# Patient Record
Sex: FEMALE | Race: WHITE | NOT HISPANIC OR LATINO | Employment: FULL TIME | URBAN - METROPOLITAN AREA
[De-identification: names, ages, dates, MRNs, and addresses within clinical notes are randomized per-mention and may not be internally consistent; named-entity substitution may affect disease eponyms.]

---

## 2017-08-01 ENCOUNTER — TELEPHONE (OUTPATIENT)
Dept: OBSTETRICS AND GYNECOLOGY | Facility: CLINIC | Age: 52
End: 2017-08-01

## 2021-12-02 ENCOUNTER — OFFICE VISIT (OUTPATIENT)
Dept: OBSTETRICS AND GYNECOLOGY | Age: 56
End: 2021-12-02

## 2021-12-02 VITALS
SYSTOLIC BLOOD PRESSURE: 124 MMHG | DIASTOLIC BLOOD PRESSURE: 72 MMHG | BODY MASS INDEX: 26.54 KG/M2 | WEIGHT: 185.4 LBS | HEIGHT: 70 IN

## 2021-12-02 DIAGNOSIS — Z80.41 FAMILY HISTORY OF OVARIAN CANCER: Primary | ICD-10-CM

## 2021-12-02 DIAGNOSIS — R87.610 ATYPICAL SQUAMOUS CELL CHANGES OF UNDETERMINED SIGNIFICANCE (ASCUS) ON CERVICAL CYTOLOGY WITH NEGATIVE HIGH RISK HUMAN PAPILLOMA VIRUS (HPV) TEST RESULT: ICD-10-CM

## 2021-12-02 DIAGNOSIS — Z78.0 MENOPAUSE: ICD-10-CM

## 2021-12-02 PROCEDURE — 99202 OFFICE O/P NEW SF 15 MIN: CPT | Performed by: OBSTETRICS & GYNECOLOGY

## 2021-12-02 RX ORDER — CETIRIZINE HYDROCHLORIDE 10 MG/1
1 TABLET ORAL
COMMUNITY
Start: 2021-11-16

## 2021-12-02 RX ORDER — RANITIDINE 150 MG/1
150 CAPSULE ORAL
COMMUNITY

## 2021-12-02 RX ORDER — ALBUTEROL SULFATE 90 UG/1
1-2 AEROSOL, METERED RESPIRATORY (INHALATION) AS NEEDED
COMMUNITY
Start: 2021-09-28

## 2021-12-02 RX ORDER — BUSPIRONE HYDROCHLORIDE 7.5 MG/1
7.5 TABLET ORAL 3 TIMES DAILY
COMMUNITY

## 2021-12-02 RX ORDER — FLUTICASONE PROPIONATE 50 MCG
2 SPRAY, SUSPENSION (ML) NASAL DAILY
COMMUNITY
Start: 2021-09-23

## 2021-12-02 NOTE — PROGRESS NOTES
New GYN Problem    Chief Complaint   Patient presents with   • Gynecologic Exam     New pt, Last pap 2021 with repeat Oct 2021.   • Establish Care       Deandra Grayson is a 56 yr old  who presents as a referral from [facility] regarding abnormal Pap.  She had a Pap in April and again in October that was ASCUS negative HPV.  She had an abnormal Pap about 5 years ago that was repeated a year later and then was normal.  She has no history of treatment for cervical dysplasia.  She was uncertain about the results she had received and has been concerned that she has cervical cancer the past month.  She has been quite anxious about this.  She has no current gynecologic complaints.  She is sexually active with her , she is  and remarried.  She has a , mostly deals with divorce and custody issues.  Took HRT for a little while started in , no hot flashes or anxiety. Did not feel much benefit and had abnormal bleeding so stopped taking. 2017 was menopausal.     [facility] UTD  c scope last year    She has significant family history, her mother and her sister both had ovarian cancer when they were in their 30s, they had hysterectomy and chemotherapy afterwards.  Also she has history of had cervical cancer, and so she had been very concerned that she also had cervical cancer.      Histories  History reviewed. No pertinent past medical history.    Past Surgical History:   Procedure Laterality Date   • TUBAL ABDOMINAL LIGATION         No family history on file.    Social History     Socioeconomic History   • Marital status:    Tobacco Use   • Smoking status: Former Smoker   • Smokeless tobacco: Never Used   Substance and Sexual Activity   • Alcohol use: Yes     Comment: occ   • Drug use: Never   • Sexual activity: Yes     Partners: Male     Birth control/protection: None     Comment:        OB History        3    Para   3    Term   3            AB         "Living   3       SAB        IAB        Ectopic        Molar        Multiple        Live Births   3                Physical Exam    /72   Ht 177.8 cm (70\")   Wt 84.1 kg (185 lb 6.4 oz)   Breastfeeding No   BMI 26.60 kg/m²     BMI: Body mass index is 26.6 kg/m².     General:   alert, appears stated age and cooperative   respiratory Regular, nonlabored breathing       Assessment/Plan    Diagnoses and all orders for this visit:    1. Family history of ovarian cancer (Primary)    2. Atypical squamous cell changes of undetermined significance (ASCUS) on cervical cytology with negative high risk human papilloma virus (HPV) test result    3. Menopause      I reviewed her Pap results with her and discussed that this is the lowest grade abnormality of Pap that she can have, especially reassuring is that her HPV testing is negative.  I reviewed that this is not cervical cancer, the usual algorithm was state to repeat the test in 3 years but we will plan to repeat Pap in 1 year, next October.  I viewed the cervical cancer does not have a hereditary component, and is caused by HPV and reviewed the natural history of HPV.  Her testing is negative.    She does have an interesting family history of ovarian cancer in her mother and her sister.  She is certain that this was not a cervical cancer.  Both are still living, they had hysterectomy and had chemotherapy afterwards.  Given this family history, I offered genetic testing today.  I reviewed with her possible risk reduction strategies if she were found to have a risk for ovarian, breast, colon, uterine cancer.  She would be interested in bilateral salpingo-oophorectomy if she were also to have a risk for ovarian cancer.    She is otherwise up-to-date with her cancer screenings.  Offered full gynecologic exam today but she declines, did just have an exam in October.      Angi Fan MD  12/02/2021  14:17 EST              "

## 2021-12-15 ENCOUNTER — TELEPHONE (OUTPATIENT)
Dept: OBSTETRICS AND GYNECOLOGY | Age: 56
End: 2021-12-15

## 2021-12-15 NOTE — TELEPHONE ENCOUNTER
Called notified her of negative genetic testing results.  She has signed into the [employer] portal and is able to see results online as well.    Angi Fan MD  12/15/2021  17:23 EST

## 2021-12-16 PROBLEM — Z80.41 FAMILY HISTORY OF OVARIAN CANCER: Status: ACTIVE | Noted: 2021-12-16
